# Patient Record
Sex: FEMALE | Race: OTHER
[De-identification: names, ages, dates, MRNs, and addresses within clinical notes are randomized per-mention and may not be internally consistent; named-entity substitution may affect disease eponyms.]

---

## 2018-07-19 ENCOUNTER — HOSPITAL ENCOUNTER (EMERGENCY)
Dept: HOSPITAL 62 - ER | Age: 63
Discharge: HOME | End: 2018-07-19
Payer: MEDICAID

## 2018-07-19 VITALS — DIASTOLIC BLOOD PRESSURE: 78 MMHG | SYSTOLIC BLOOD PRESSURE: 157 MMHG

## 2018-07-19 DIAGNOSIS — Z88.1: ICD-10-CM

## 2018-07-19 DIAGNOSIS — E11.65: ICD-10-CM

## 2018-07-19 DIAGNOSIS — R06.02: ICD-10-CM

## 2018-07-19 DIAGNOSIS — Z87.01: ICD-10-CM

## 2018-07-19 DIAGNOSIS — R63.0: ICD-10-CM

## 2018-07-19 DIAGNOSIS — R05: ICD-10-CM

## 2018-07-19 DIAGNOSIS — Z88.7: ICD-10-CM

## 2018-07-19 DIAGNOSIS — T38.3X6A: ICD-10-CM

## 2018-07-19 DIAGNOSIS — J20.9: Primary | ICD-10-CM

## 2018-07-19 DIAGNOSIS — Z91.14: ICD-10-CM

## 2018-07-19 DIAGNOSIS — Z88.8: ICD-10-CM

## 2018-07-19 DIAGNOSIS — Z79.84: ICD-10-CM

## 2018-07-19 DIAGNOSIS — Z91.128: ICD-10-CM

## 2018-07-19 LAB
ADD MANUAL DIFF: NO
ALBUMIN SERPL-MCNC: 4.1 G/DL (ref 3.5–5)
ALP SERPL-CCNC: 123 U/L (ref 38–126)
ALT SERPL-CCNC: 35 U/L (ref 9–52)
ANION GAP SERPL CALC-SCNC: 13 MMOL/L (ref 5–19)
APPEARANCE UR: CLEAR
APTT PPP: (no result) S
AST SERPL-CCNC: 19 U/L (ref 14–36)
BASOPHILS # BLD AUTO: 0 10^3/UL (ref 0–0.2)
BASOPHILS NFR BLD AUTO: 0.3 % (ref 0–2)
BILIRUB DIRECT SERPL-MCNC: 0.3 MG/DL (ref 0–0.4)
BILIRUB SERPL-MCNC: 0.4 MG/DL (ref 0.2–1.3)
BILIRUB UR QL STRIP: NEGATIVE
BUN SERPL-MCNC: 21 MG/DL (ref 7–20)
CALCIUM: 9.4 MG/DL (ref 8.4–10.2)
CHLORIDE SERPL-SCNC: 100 MMOL/L (ref 98–107)
CK SERPL-CCNC: 92 U/L (ref 30–135)
CO2 SERPL-SCNC: 24 MMOL/L (ref 22–30)
EOSINOPHIL # BLD AUTO: 0 10^3/UL (ref 0–0.6)
EOSINOPHIL NFR BLD AUTO: 0.1 % (ref 0–6)
ERYTHROCYTE [DISTWIDTH] IN BLOOD BY AUTOMATED COUNT: 14.4 % (ref 11.5–14)
GLUCOSE SERPL-MCNC: 526 MG/DL (ref 75–110)
GLUCOSE UR STRIP-MCNC: >=500 MG/DL
HCT VFR BLD CALC: 38.8 % (ref 36–47)
HGB BLD-MCNC: 13.1 G/DL (ref 12–15.5)
KETONES UR STRIP-MCNC: (no result) MG/DL
LYMPHOCYTES # BLD AUTO: 0.5 10^3/UL (ref 0.5–4.7)
LYMPHOCYTES NFR BLD AUTO: 9.6 % (ref 13–45)
MCH RBC QN AUTO: 27.5 PG (ref 27–33.4)
MCHC RBC AUTO-ENTMCNC: 33.8 G/DL (ref 32–36)
MCV RBC AUTO: 81 FL (ref 80–97)
MONOCYTES # BLD AUTO: 0.1 10^3/UL (ref 0.1–1.4)
MONOCYTES NFR BLD AUTO: 2.1 % (ref 3–13)
NEUTROPHILS # BLD AUTO: 5 10^3/UL (ref 1.7–8.2)
NEUTS SEG NFR BLD AUTO: 87.9 % (ref 42–78)
NITRITE UR QL STRIP: NEGATIVE
PH UR STRIP: 5 [PH] (ref 5–9)
PLATELET # BLD: 246 10^3/UL (ref 150–450)
POTASSIUM SERPL-SCNC: 5.1 MMOL/L (ref 3.6–5)
PROT SERPL-MCNC: 7.1 G/DL (ref 6.3–8.2)
PROT UR STRIP-MCNC: NEGATIVE MG/DL
RBC # BLD AUTO: 4.77 10^6/UL (ref 3.72–5.28)
SODIUM SERPL-SCNC: 137.3 MMOL/L (ref 137–145)
SP GR UR STRIP: 1.02
TOTAL CELLS COUNTED % (AUTO): 100 %
UROBILINOGEN UR-MCNC: NEGATIVE MG/DL (ref ?–2)
WBC # BLD AUTO: 5.7 10^3/UL (ref 4–10.5)

## 2018-07-19 PROCEDURE — 71046 X-RAY EXAM CHEST 2 VIEWS: CPT

## 2018-07-19 PROCEDURE — 85025 COMPLETE CBC W/AUTO DIFF WBC: CPT

## 2018-07-19 PROCEDURE — 36415 COLL VENOUS BLD VENIPUNCTURE: CPT

## 2018-07-19 PROCEDURE — 81001 URINALYSIS AUTO W/SCOPE: CPT

## 2018-07-19 PROCEDURE — 80053 COMPREHEN METABOLIC PANEL: CPT

## 2018-07-19 PROCEDURE — 96360 HYDRATION IV INFUSION INIT: CPT

## 2018-07-19 PROCEDURE — 82962 GLUCOSE BLOOD TEST: CPT

## 2018-07-19 PROCEDURE — 87040 BLOOD CULTURE FOR BACTERIA: CPT

## 2018-07-19 PROCEDURE — 99284 EMERGENCY DEPT VISIT MOD MDM: CPT

## 2018-07-19 PROCEDURE — 82550 ASSAY OF CK (CPK): CPT

## 2018-07-19 PROCEDURE — 84484 ASSAY OF TROPONIN QUANT: CPT

## 2018-07-19 PROCEDURE — 83735 ASSAY OF MAGNESIUM: CPT

## 2018-07-19 PROCEDURE — 83605 ASSAY OF LACTIC ACID: CPT

## 2018-07-19 NOTE — ER DOCUMENT REPORT
ED Respiratory Problem





<ALLYN MAIN - Last Filed: 07/19/18 12:12>





- General


Mode of Arrival: Ambulatory


Information source: Patient


TRAVEL OUTSIDE OF THE [place] IN LAST 30 DAYS: No





<THOMAS MENDOZA - Last Filed: 07/19/18 14:04>





- General


Chief Complaint: Cough


Stated Complaint: COUGH


Time Seen by Provider: 07/19/18 08:36


Notes: 





This 63-year-old female patient Seth Parikh is here staying with her daughter.  

She has had a cough for the past 2 months, last few days it is turned yellow 

productive.  She did notice that she had a decreased appetite, this happened 

previously when she had pneumonia.  They decided last night to go to the 

hospital and went out to the [facility] where the daughter is eligible to 

be seen.  She had a workup, which included blood work and a CTA of the chest.  

The CT scan showed possible inflammatory changes in the right upper lobe.  The 

chemistries showed a slightly elevated BUN and creatinine.  The CBC did not 

indicate what the WBCs were only what the differential was.  He did have a 

blood sugar around 500, and admits that she has not had insulin for a few days 

although she has had her metformin. 





      Based on the above, she was diagnosed with pneumonia and acute kidney 

injury and referred to this facility for admission.


The patient reports that she did not have any fevers.


She reports that she was wheezing last night.


Her treatment at [facility] included a nebulizer treatment, prednisone 40 mg

, moxifloxacin IV and a liter of normal saline IV.  She also received regular 

insulin 10 units IV twice for a total of 20 units of insulin. (ALLYN MAIN)





- Related Data


Allergies/Adverse Reactions: 


 





erythromycin base Allergy (Verified 07/19/18 11:07)


 


fluoxetine [From Prozac] Allergy (Verified 07/19/18 08:36)


 


influenza virus vaccine, specific Allergy (Verified 07/19/18 08:36)


 


pneumococcal vaccine Allergy (Verified 07/19/18 08:36)


 


sulfamethoxazole [From Bactrim] Allergy (Verified 07/19/18 11:07)


 


trimethoprim [From Bactrim] Allergy (Verified 07/19/18 11:07)


 











Past Medical History





- General


Information source: Patient





- Social History


Smoking Status: Never Smoker


Cigarette use (# per day): No


Frequency of alcohol use: None


Drug Abuse: None


Lives with: Family


Family History: Reviewed & Not Pertinent





- Medical History


Medical History: Negative


Past Surgical History: Reports: Hx Appendectomy, Hx Tubal Ligation, Other - 

Right shoulder surgery, left breast lumpectomy





<THOMAS MENDOZA - Last Filed: 07/19/18 14:04>





Review of Systems





- Review of Systems


Constitutional: No symptoms reported


EENT: No symptoms reported


Cardiovascular: No symptoms reported


Respiratory: Cough, Short of breath, Sputum, Wheezing


Gastrointestinal: Poor appetite


Genitourinary: No symptoms reported


Female Genitourinary: Post menopausal


Musculoskeletal: No symptoms reported


Skin: No symptoms reported


Hematologic/Lymphatic: No symptoms reported


Neurological/Psychological: No symptoms reported





<ALLYN MAIN - Last Filed: 07/19/18 12:12>





Physical Exam





- Vital signs


Interpretation: Hypertensive





- General


General appearance: Appears well, Alert


In distress: None





- HEENT


Head: Normocephalic, Atraumatic


Eyes: Normal


Pupils: PERRL


Neck: Normal





- Respiratory


Respiratory status: No respiratory distress


Chest status: Nontender


Breath sounds: Other - There are some coarse breath sounds most noticeable with 

forced cough.  There is no wheezing heard at this time.





- Cardiovascular


Rhythm: Regular


Heart sounds: Normal auscultation


Murmur: No





- Abdominal


Inspection: Normal


Distension: No distension


Bowel sounds: Normal


Tenderness: Nontender





- Back


Back: Normal





- Extremities


General upper extremity: Normal inspection


General lower extremity: Normal inspection





- Neurological


Neuro grossly intact: Yes





- Psychological


Associated symptoms: Normal affect, Normal mood





- Skin


Skin Temperature: Warm


Skin Moisture: Dry


Skin Color: Normal





<ALLYN MAIN - Last Filed: 07/19/18 12:12>





<THOMAS MENDOZA - Last Filed: 07/19/18 14:04>





- Vital signs


Vitals: 


 











Resp BP Pulse Ox


 


 17   165/103 H  97 


 


 07/19/18 08:30  07/19/18 08:30  07/19/18 08:30














- Notes


Notes: 





Physical Exam:


 


General: Alert, appears well. 


 


HEENT: Normocephalic. Atraumatic. PERRL. Extraocular movements intact. 

Oropharynx clear.


 


Neck: Supple. Non-tender.


 


Respiratory: No respiratory distress.  Coarse breath sounds with forced cough.  

Saturating 98% on room air.


 


Cardiovascular: Regular rate and rhythm. 


 


Abdominal: Normal Inspection. Non-tender. No distension. Normal Bowel Sounds. 


 


Back: Non-tender. No deformity or step off.


 


Extremities: Moves all four extremities.


Upper extremities: Normal inspection. Normal ROM.  


Lower extremities: Normal inspection. No edema. Normal ROM.


 


Neurological: Normal cognition. AAOx4. Normal speech.  


 


Psychological: Normal affect. Normal Mood. 


 


Skin: Warm. Dry. Normal color. (THOMAS MENDOZA)





- General


Notes: 





This time the patient has no wheezing, has a room air pulse ox of 97%. (ALLYN MAIN)





Course





- Laboratory


Result Diagrams: 


 07/19/18 09:32





 07/19/18 09:32





- Diagnostic Test


Radiology reviewed: Reports reviewed - Chest x-ray does not show any acute 

process.





<ALLYN MAIN - Last Filed: 07/19/18 12:12>





- Laboratory


Result Diagrams: 


 07/19/18 09:32





 07/19/18 09:32





<THOMAS MENDOZA - Last Filed: 07/19/18 14:04>





- Re-evaluation


Re-evalutation: 





07/19/18 12:27


The patient's lungs continue to be clear to auscultate.  On forced cough it is 

a very harsh bronchitic sound without wheezes.


Chest x-ray does not show infiltrate, white blood cell count is quite low, the 

only real problems on finding is the patient has bronchitis with bronchospasm 

and blood sugars out of control due to running out of insulin.  She states the 

refrigerator she has been using was broken and she lost her insulin.  She did 

move here only 2 days ago from [place].  She does have all her other 

regular medications.  She states that she takes Humalog 75/25 50 units every 

morning and 50 units every afternoon.  She also states she takes Lantus 2 units 

nightly.


I will prescribe the patient doxycycline for bronchitis, albuterol inhaler for 

wheezing.  We will not use steroids due to the blood sugar problems.  She will 

get a prescription for her Humalog 75/25 but will not write for Lantus symptoms 

that minuscule amount does not make sense and it would not really benefit her.


She is encouraged to follow-up with a local medical doctor to manage her 

medical problems. (ALLYN MAIN)





- Vital Signs


Vital signs: 


 











Temp Pulse Resp BP Pulse Ox


 


 97.6 F      18   157/78 H  95 


 


 07/19/18 12:46     07/19/18 12:40  07/19/18 12:40  07/19/18 12:40














- Laboratory


Laboratory results interpreted by me: 


 











  07/19/18 07/19/18 07/19/18





  08:24 09:32 09:32


 


RDW   14.4 H 


 


Seg Neutrophils %   87.9 H 


 


Lymphocytes %   9.6 L 


 


Monocytes %   2.1 L 


 


Potassium    5.1 H


 


BUN    21 H


 


Glucose    526 H*


 


POC Glucose  500 H*  


 


Urine Glucose (UA)   


 


Urine Ketones   


 


Urine Blood   














  07/19/18 07/19/18





  09:32 12:06


 


RDW  


 


Seg Neutrophils %  


 


Lymphocytes %  


 


Monocytes %  


 


Potassium  


 


BUN  


 


Glucose  


 


POC Glucose   345 H


 


Urine Glucose (UA)  >=500 H 


 


Urine Ketones  TRACE H 


 


Urine Blood  MODERATE H 














Discharge





<ALLYN MAIN - Last Filed: 07/19/18 12:12>





<THOMAS MENDOZA - Last Filed: 07/19/18 14:04>





- Discharge


Clinical Impression: 


 Acute bronchitis with bronchospasm, Has run out of medications





Hyperglycemia due to type 2 diabetes mellitus


Qualifiers:


 Diabetes mellitus long term insulin use: with long term use Qualified Code(s): 

E11.65 - Type 2 diabetes mellitus with hyperglycemia





Condition: Stable


Disposition: HOME, SELF-CARE


Additional Instructions: 


Bronchitis with Bronchospasm (Wheezing)





     You have  bronchitis with bronchospasm (wheezing).  Sometimes people 

develop wheezing with a chest cold.  This occurs either because of an 

underlying tendency toward asthma or because the virus itself irritates the 

bronchial tubes.  This irritation causes cough, shortness of breath, and 

wheezing.


     Emergency treatment of bronchospasm may include adrenaline shots or 

bronchodilator aerosol.  You may feel lightheaded and have a rapid pulse for an 

hour or two.  Rest and get plenty of fluids.


     At home, we'll treat you with a bronchodilator inhaler. Corticosteroids 

may be required for some patients. Until you recover, avoid chemical fumes, 

dusts, pollens, and exercising in very cold or dry air. If you smoke, stop now!


     Most cases of bronchitis get better without antibiotics.  We prescribe 

antibiotics when we believe bacteria are damaging your airways, or if there's 

high risk the bronchitis will worsen into pneumonia. Increase your fluid 

intake. A cool mist humidifier may make your lungs more comfortable.  An 

expectorant (cough medicine that loosens phlegm) can help.


     Repeated episodes of bronchitis and bronchospasm may result in lung damage 

-- for example, chronic bronchitis, recurrent pneumonias, or emphysema. If you 

develop a fever, increased wheezing, chest pain, or severe shortness of breath, 

you should contact the doctor immediately.








********************************************************************************

************





Take the medications as prescribed.


Continue all your regular medications.


Drink plenty of fluids today and tomorrow.


Try Robitussin-DM to help control your cough.


Follow-up with a local medical doctor to manage all of your medical problems 

while you are living here.








RETURN TO THE EMERGENCY ROOM IF ANY NEW OR WORSENING SYMPTOMS.








Prescriptions: 


Albuterol Sulfate [Proair HFA] 1 - 2 puff IH Q4 PRN #1 inhaler


 PRN Reason: 


Doxycycline Hyclate 100 mg PO BID #14 tablet


Insulin Lispro Protamin/Lispro [Humalog Mix 75-25 Vial] 50 unit SQ BID #1 vial


Scribe Attestation: 





07/19/18 09:38


I personally performed the services described in the documentation, reviewed 

and edited the documentation which was dictated to the scribe in my presence, 

and it accurately records my words and actions. (ALLYN MAIN)





Scribe Documentation





- Scribe


Written by Basia:: Basia Gabriel, 7/19/2018 1404


acting as scribe for :: Jez





<THOMAS MENDOZA - Last Filed: 07/19/18 14:04>

## 2018-07-19 NOTE — RADIOLOGY REPORT (SQ)
EXAM DESCRIPTION:  CHEST 2 VIEWS



COMPLETED DATE/TIME:  7/19/2018 9:49 am



REASON FOR STUDY:  Yellow productive cough



COMPARISON:  None.



EXAM PARAMETERS:  NUMBER OF VIEWS: two views

TECHNIQUE: Digital Frontal and Lateral radiographic views of the chest acquired.

RADIATION DOSE: NA

LIMITATIONS: none



FINDINGS:  LUNGS AND PLEURA: No opacities, masses or pneumothorax. No pleural effusion.

MEDIASTINUM AND HILAR STRUCTURES: No masses or contour abnormalities.

HEART AND VASCULAR STRUCTURES: Heart normal size.  No evidence for failure.

BONES: No acute findings.

HARDWARE: None in the chest.

OTHER: No other significant finding.



IMPRESSION:  NO ACUTE RADIOGRAPHIC FINDING IN THE CHEST.



TECHNICAL DOCUMENTATION:  JOB ID:  [id number]

 2011 [employer]- All Rights Reserved



Reading location - IP/workstation name: [email or web address]

## 2019-01-29 ENCOUNTER — HOSPITAL ENCOUNTER (OUTPATIENT)
Dept: HOSPITAL 62 - OD | Age: 64
End: 2019-01-29
Attending: FAMILY MEDICINE
Payer: MEDICAID

## 2019-01-29 DIAGNOSIS — M54.5: ICD-10-CM

## 2019-01-29 DIAGNOSIS — M48.07: ICD-10-CM

## 2019-01-29 DIAGNOSIS — M25.552: Primary | ICD-10-CM

## 2019-01-29 DIAGNOSIS — M16.12: ICD-10-CM

## 2019-01-29 PROCEDURE — 72110 X-RAY EXAM L-2 SPINE 4/>VWS: CPT

## 2019-01-29 NOTE — RADIOLOGY REPORT (SQ)
EXAM DESCRIPTION:  HIP LEFT AP/LATERAL



COMPLETED DATE/TIME:  1/29/2019 12:46 pm



REASON FOR STUDY:  LEFT HIP PAIN;LBP POTENTIALLY ASSOCIATED WITH RADICULOPATHY M25.552  PAIN IN LEFT 
HIP M54.5  LOW BACK PAIN



COMPARISON:  None.



NUMBER OF VIEWS:  Two views.



TECHNIQUE:  AP pelvis and additional frog-leg view of the left hip.



LIMITATIONS:  None.



FINDINGS:  MINERALIZATION: Normal.

LEFT HIP: No fracture or dislocation.  Spurring off the greater trochanter of hip.

RIGHT HIP:  Spurring off of the greater trochanter of the hip. No fracture or dislocation.

PUBIS AND ISCHIUM:  Degenerative changes at the symphysis pubis.  No fracture.

PELVIS: No fracture.  Pelvic vascular calcifications and calcified phleboliths in the pelvis.

SACRUM: No fracture or dislocation.  Degenerative changes at the sacral iliac joints No worrisome bon
e lesions.

LOWER LUMBAR SPINE:  Facet arthropathy lower lumbar spine, more so on the left.  No fracture or dislo
cation.

SOFT TISSUES: No findings.

OTHER: No other significant finding.



IMPRESSION:  1.  No acute osseous findings.

2. Degenerative changes at the sacro-iliac joints, symphysis pubis, and lower lumbar spine facet arth
ropathy.

3.  Mild spurring off of the greater trochanters of the hips.



TECHNICAL DOCUMENTATION:  JOB ID:  [id number]

 2011 [employer]- All Rights Reserved



Reading location - IP/workstation name: [email or web address]

## 2019-01-29 NOTE — RADIOLOGY REPORT (SQ)
EXAM DESCRIPTION:  LUMBAR SPINE COMPLETE



COMPLETED DATE/TIME:  1/29/2019 12:46 pm



REASON FOR STUDY:  LEFT HIP PAIN;LBP POTENTIALLY ASSOCIATED WITH RADICULOPATHY M25.552  PAIN IN LEFT 
HIP M54.5  LOW BACK PAIN



COMPARISON:  None.



NUMBER OF VIEWS:  Five views including obliques.



TECHNIQUE:  AP, lateral, oblique, and sacral radiographic images acquired of the lumbar spine.



LIMITATIONS:  None.



FINDINGS:  MINERALIZATION: Normal.

SEGMENTATION: 5 non-rib-bearing lumbar vertebral bodies.  No transitional anatomy.

ALIGNMENT: Normal.

VERTEBRAE: Maintained height.  No fracture or worrisome bone lesion.

DISCS: Preserved height.  No significant osteophytes or end plate irregularity.

POSTERIOR ELEMENTS: Lower lumbar facet arthropathy.  Facets are intact.  No pars defect.  Mild osseou
s neural foraminal narrowing at L4-5 and L5-S1 bilaterally.

HARDWARE: None in the spine.

PARASPINAL SOFT TISSUES: Scattered aortic atherosclerosis.

PELVIS: Intact as visualized. No fractures or worrisome bone lesions. SI joints intact.

OTHER: No other significant finding.



IMPRESSION:  No evidence of acute bony abnormality.

Lower lumbar facet arthropathy greatest on the left with mild osseous neural foraminal narrowing at L
4-5 and L5-S1.



TECHNICAL DOCUMENTATION:  JOB ID:  [id number]

 2011 [employer]- All Rights Reserved



Reading location - IP/workstation name: [email or web address]